# Patient Record
Sex: MALE | HISPANIC OR LATINO | Employment: FULL TIME
[De-identification: names, ages, dates, MRNs, and addresses within clinical notes are randomized per-mention and may not be internally consistent; named-entity substitution may affect disease eponyms.]

---

## 2018-08-07 ENCOUNTER — CHARTING TRANS (OUTPATIENT)
Dept: OTHER | Age: 28
End: 2018-08-07

## 2018-08-07 ENCOUNTER — LAB SERVICES (OUTPATIENT)
Dept: OTHER | Age: 28
End: 2018-08-07

## 2018-08-07 LAB — RAPID STREP GROUP A: NORMAL

## 2018-10-31 VITALS
HEIGHT: 72 IN | RESPIRATION RATE: 20 BRPM | SYSTOLIC BLOOD PRESSURE: 120 MMHG | HEART RATE: 77 BPM | TEMPERATURE: 98.6 F | WEIGHT: 210 LBS | DIASTOLIC BLOOD PRESSURE: 62 MMHG | OXYGEN SATURATION: 98 % | BODY MASS INDEX: 28.44 KG/M2

## 2019-11-08 ENCOUNTER — APPOINTMENT (OUTPATIENT)
Dept: GENERAL RADIOLOGY | Facility: HOSPITAL | Age: 29
End: 2019-11-08
Attending: EMERGENCY MEDICINE
Payer: COMMERCIAL

## 2019-11-08 ENCOUNTER — HOSPITAL ENCOUNTER (EMERGENCY)
Facility: HOSPITAL | Age: 29
Discharge: HOME | End: 2019-11-08
Attending: EMERGENCY MEDICINE
Payer: COMMERCIAL

## 2019-11-08 VITALS
OXYGEN SATURATION: 98 % | WEIGHT: 228 LBS | TEMPERATURE: 97 F | HEART RATE: 73 BPM | SYSTOLIC BLOOD PRESSURE: 134 MMHG | DIASTOLIC BLOOD PRESSURE: 78 MMHG | RESPIRATION RATE: 18 BRPM

## 2019-11-08 DIAGNOSIS — R42 DIZZINESS: Primary | ICD-10-CM

## 2019-11-08 DIAGNOSIS — R06.00 DYSPNEA, UNSPECIFIED TYPE: ICD-10-CM

## 2019-11-08 PROCEDURE — 85379 FIBRIN DEGRADATION QUANT: CPT | Performed by: EMERGENCY MEDICINE

## 2019-11-08 PROCEDURE — 93005 ELECTROCARDIOGRAM TRACING: CPT

## 2019-11-08 PROCEDURE — 84484 ASSAY OF TROPONIN QUANT: CPT | Performed by: EMERGENCY MEDICINE

## 2019-11-08 PROCEDURE — 36415 COLL VENOUS BLD VENIPUNCTURE: CPT

## 2019-11-08 PROCEDURE — 93010 ELECTROCARDIOGRAM REPORT: CPT | Performed by: EMERGENCY MEDICINE

## 2019-11-08 PROCEDURE — 99284 EMERGENCY DEPT VISIT MOD MDM: CPT

## 2019-11-08 PROCEDURE — 71046 X-RAY EXAM CHEST 2 VIEWS: CPT | Performed by: EMERGENCY MEDICINE

## 2019-11-08 PROCEDURE — 87430 STREP A AG IA: CPT

## 2019-11-08 PROCEDURE — 85025 COMPLETE CBC W/AUTO DIFF WBC: CPT | Performed by: EMERGENCY MEDICINE

## 2019-11-08 PROCEDURE — 80048 BASIC METABOLIC PNL TOTAL CA: CPT | Performed by: EMERGENCY MEDICINE

## 2019-11-09 NOTE — ED NOTES
Received pt from triage. Pt states he had and episode of dizziness where he was unable to focus for a brief period of time. Pt arrives to room, ambulatory, alert and orientated X3 with c/o left sided chest pain, cough and a sore throat.  Denies any diff raghavendra

## 2019-11-09 NOTE — ED INITIAL ASSESSMENT (HPI)
Pt c/o CP, RAFAEL, and dizziness x1 day, he states that symptoms worsened while walking out of the store x20 minutes prior to arrival which worried him. RR is easy and nonlabored in triage.

## 2019-11-10 NOTE — ED PROVIDER NOTES
Patient Seen in: [facility] AND [facility] Emergency Department      History   Patient presents with:  Dyspnea RAFAEL SOB (respiratory)  Chest Pain Angina (cardiovascular)    Stated Complaint: short of breath    HPI    The patient is a 17-year-old male who presents diaphoretic. HENT:      Head: Normocephalic and atraumatic. Mouth/Throat:      Mouth: Mucous membranes are moist.   Eyes:      Conjunctiva/sclera: Conjunctivae normal.      Pupils: Pupils are equal, round, and reactive to light.    Neck:      Musculo -----------         ------                     CBC W/ DIFFERENTIAL[[id number]]                              Final result                 Please view results for these tests on the individual orders.    39 Kim Najera

## 2019-11-13 ENCOUNTER — HOSPITAL ENCOUNTER (EMERGENCY)
Facility: HOSPITAL | Age: 29
Discharge: HOME | End: 2019-11-13
Attending: EMERGENCY MEDICINE
Payer: COMMERCIAL

## 2019-11-13 VITALS
HEIGHT: 72 IN | SYSTOLIC BLOOD PRESSURE: 136 MMHG | HEART RATE: 70 BPM | BODY MASS INDEX: 30.88 KG/M2 | OXYGEN SATURATION: 100 % | TEMPERATURE: 98 F | WEIGHT: 228 LBS | DIASTOLIC BLOOD PRESSURE: 83 MMHG | RESPIRATION RATE: 18 BRPM

## 2019-11-13 DIAGNOSIS — J06.9 VIRAL URI: Primary | ICD-10-CM

## 2019-11-13 PROCEDURE — 99282 EMERGENCY DEPT VISIT SF MDM: CPT

## 2019-11-13 RX ORDER — IBUPROFEN 600 MG/1
600 TABLET ORAL EVERY 8 HOURS PRN
Qty: 30 TABLET | Refills: 0 | Status: SHIPPED | OUTPATIENT
Start: 2019-11-13 | End: 2019-11-20

## 2019-11-13 RX ORDER — IBUPROFEN 600 MG/1
600 TABLET ORAL ONCE
Status: COMPLETED | OUTPATIENT
Start: 2019-11-13 | End: 2019-11-13

## 2019-11-13 NOTE — ED PROVIDER NOTES
Patient Seen in: [facility] AND [facility] Emergency Department      History   Patient presents with:  Cough/URI    Stated Complaint: cough, sore throat    HPI    33-year-old male with no significant past medical history presents to the emergency department for with no exudates  Eyes: Conjunctivae and EOM are normal. PERRLA  Neck: Normal range of motion. Neck supple. No tracheal deviation present. CV: s1s2+, RRR, no m/r/g, normal distal pulses  Pulmonary/Chest: CTA b/l with no rales, wheezes.   No chest wall ten

## 2019-11-15 ENCOUNTER — OFFICE VISIT (OUTPATIENT)
Dept: INTERNAL MEDICINE CLINIC | Facility: CLINIC | Age: 29
End: 2019-11-15
Payer: COMMERCIAL

## 2019-11-15 VITALS
RESPIRATION RATE: 18 BRPM | WEIGHT: 236 LBS | DIASTOLIC BLOOD PRESSURE: 79 MMHG | SYSTOLIC BLOOD PRESSURE: 123 MMHG | HEIGHT: 72 IN | BODY MASS INDEX: 31.97 KG/M2 | HEART RATE: 80 BPM | TEMPERATURE: 98 F | OXYGEN SATURATION: 98 %

## 2019-11-15 DIAGNOSIS — R00.2 PALPITATIONS: Primary | ICD-10-CM

## 2019-11-15 DIAGNOSIS — R06.00 EXERTIONAL DYSPNEA: ICD-10-CM

## 2019-11-15 DIAGNOSIS — J01.10 ACUTE FRONTAL SINUSITIS, RECURRENCE NOT SPECIFIED: ICD-10-CM

## 2019-11-15 DIAGNOSIS — R68.89 FLU-LIKE SYMPTOMS: ICD-10-CM

## 2019-11-15 DIAGNOSIS — R51.9 HEADACHE DISORDER: ICD-10-CM

## 2019-11-15 DIAGNOSIS — R55 PRE-SYNCOPE: ICD-10-CM

## 2019-11-15 DIAGNOSIS — R07.89 SENSATION OF CHEST PRESSURE: ICD-10-CM

## 2019-11-15 DIAGNOSIS — R53.83 OTHER FATIGUE: ICD-10-CM

## 2019-11-15 DIAGNOSIS — R42 DIZZY SPELLS: ICD-10-CM

## 2019-11-15 PROCEDURE — 99204 OFFICE O/P NEW MOD 45 MIN: CPT | Performed by: INTERNAL MEDICINE

## 2019-11-15 PROCEDURE — 93000 ELECTROCARDIOGRAM COMPLETE: CPT | Performed by: INTERNAL MEDICINE

## 2019-11-15 RX ORDER — AZITHROMYCIN 250 MG/1
TABLET, FILM COATED ORAL
Qty: 6 TABLET | Refills: 0 | Status: SHIPPED | OUTPATIENT
Start: 2019-11-15 | End: 2019-12-07 | Stop reason: ALTCHOICE

## 2019-11-15 NOTE — PROGRESS NOTES
HPI:    Patient ID: Keith Johnson is a 30year old male.     HPI  Patient comes in today for first time to establish care he was recently seen in ER with symptoms of cold congestion cough fatigue work-up was all negative chest x-ray negative he had EKG t Physical Exam   Constitutional: He is oriented to person, place, and time. He appears well-developed and well-nourished. HENT:   Head: Normocephalic and atraumatic. Eyes: Pupils are equal, round, and reactive to light.  Conjunctivae and EOM are normal [E]      Meds This Visit:  Requested Prescriptions     Signed Prescriptions Disp Refills   • azithromycin (ZITHROMAX Z-BATOOL) 250 MG Oral Tab 6 tablet 0     Sig: Take two tablets by mouth today, then one tablet daily.        Imaging & Referrals:  ELECTROCARDI

## 2019-11-17 NOTE — PROGRESS NOTES
Flu negative continue with current care, if not better to let us know, will call him about the other results

## 2019-11-22 ENCOUNTER — TELEPHONE (OUTPATIENT)
Dept: INTERNAL MEDICINE CLINIC | Facility: CLINIC | Age: 29
End: 2019-11-22

## 2019-11-22 NOTE — TELEPHONE ENCOUNTER
Patient came to the office to drop of short term disability forms he states he would like to get billed.   He started on 11-18-19

## 2019-11-25 ENCOUNTER — TELEPHONE (OUTPATIENT)
Dept: INTERNAL MEDICINE CLINIC | Facility: CLINIC | Age: 29
End: 2019-11-25

## 2019-11-25 ENCOUNTER — OFFICE VISIT (OUTPATIENT)
Dept: INTERNAL MEDICINE CLINIC | Facility: CLINIC | Age: 29
End: 2019-11-25
Payer: COMMERCIAL

## 2019-11-25 VITALS
BODY MASS INDEX: 31.15 KG/M2 | WEIGHT: 230 LBS | SYSTOLIC BLOOD PRESSURE: 132 MMHG | RESPIRATION RATE: 16 BRPM | TEMPERATURE: 97 F | HEART RATE: 63 BPM | DIASTOLIC BLOOD PRESSURE: 82 MMHG | HEIGHT: 72 IN | OXYGEN SATURATION: 99 %

## 2019-11-25 DIAGNOSIS — R76.8 POSITIVE CMV IGG SEROLOGY: ICD-10-CM

## 2019-11-25 DIAGNOSIS — R07.89 SENSATION OF CHEST PRESSURE: ICD-10-CM

## 2019-11-25 DIAGNOSIS — R06.00 EXERTIONAL DYSPNEA: ICD-10-CM

## 2019-11-25 DIAGNOSIS — R00.2 PALPITATIONS: ICD-10-CM

## 2019-11-25 DIAGNOSIS — M79.10 MYALGIA: ICD-10-CM

## 2019-11-25 DIAGNOSIS — R42 DIZZY SPELLS: ICD-10-CM

## 2019-11-25 DIAGNOSIS — R53.83 OTHER FATIGUE: ICD-10-CM

## 2019-11-25 DIAGNOSIS — Z09 FOLLOW UP: Primary | ICD-10-CM

## 2019-11-25 PROCEDURE — 99214 OFFICE O/P EST MOD 30 MIN: CPT | Performed by: INTERNAL MEDICINE

## 2019-11-25 NOTE — TELEPHONE ENCOUNTER
Patient is experiencing headache, dizziness, chest pain, and having a hard time breathing. Transferring to triage.

## 2019-11-25 NOTE — TELEPHONE ENCOUNTER
Pt states he was seen in the office last Monday 11/18/19 for difficulty breathing, headache, and lightheadedness. Pt states he has had no improvement in symptoms. Pt states when he walks for too long he gets short of breath. Pt feels lightheaded when he gets short of breath. Pt also has a constant headache. Pt has completed course of Z chas as prescribed. Pt denies nasal congestion. Pt did not get Holter monitor today, states he had to cancel appt as scheduled today as he overslept. Pt also states he continues to have the chest pain that he has had for about two weeks. Appt scheduled for today, pt disconnected call. Left message to call back to discuss chest pain further.

## 2019-11-25 NOTE — PROGRESS NOTES
HPI:    Patient ID: Celi Spencer is a 30year old male.     HPI  Patient comes in for follow-up he complain continues to feel tired short of breath sometimes chest pain dizzy he went to immediate care also and he had some extensive lab work out on top and no friction rub. No murmur heard. Pulmonary/Chest: Effort normal and breath sounds normal.   Abdominal: Soft. Bowel sounds are normal. Musculoskeletal: Normal range of motion. Neurological: He is alert and oriented to person, place, and time.

## 2019-11-26 ENCOUNTER — TELEPHONE (OUTPATIENT)
Dept: INTERNAL MEDICINE CLINIC | Facility: CLINIC | Age: 29
End: 2019-11-26

## 2019-11-26 NOTE — TELEPHONE ENCOUNTER
Patient stated he dropped the FMLA form to the [place] site and he would like to know the status of the form. Please call him back. Thank you.

## 2019-11-26 NOTE — TELEPHONE ENCOUNTER
Dr. Freddy Watson,    [place] pending. Pt is requesting intermittent leave of 1-3 days per WEEK starting from 11/18/19 due to dizziness, dyspnea for 6 months. Do you approve? Please advise.     Thank you,  [facility]

## 2019-11-26 NOTE — TELEPHONE ENCOUNTER
Wrong type of Forms recvd at Forms Dept. Spoke to pt to have Gerardo fax the correct Intermittent FMLA form.

## 2019-11-27 NOTE — TELEPHONE ENCOUNTER
Relayed 's message to pt and pt stated that he will stop by [facility] office to speak with Dr. Huang Escort waiting on correct FMLA forms.

## 2019-11-27 NOTE — TELEPHONE ENCOUNTER
Patient stopped at the [facility] office, brought in forms.   Dr. Alexis Carcamo completed forms, and was faxed back to [phone]

## 2019-12-02 NOTE — TELEPHONE ENCOUNTER
Patient alejandrina in new [facility] paperwork. Dr Reny Sharma filled out the paperwork and spoke to the patient. Faxed to [phone]. Also a copy of paperwork scanned to Forms Dept and copy sent for scanning.

## 2019-12-06 ENCOUNTER — NURSE TRIAGE (OUTPATIENT)
Dept: INTERNAL MEDICINE CLINIC | Facility: CLINIC | Age: 29
End: 2019-12-06

## 2019-12-06 ENCOUNTER — TELEPHONE (OUTPATIENT)
Dept: INTERNAL MEDICINE CLINIC | Facility: CLINIC | Age: 29
End: 2019-12-06

## 2019-12-06 NOTE — TELEPHONE ENCOUNTER
Patient states he is experiencing chest pain, shortness of breath, and fatigue.     Transferred to Triage

## 2019-12-06 NOTE — TELEPHONE ENCOUNTER
Called patient, no answer. Left VM to call us back to  Let us know what else is needed for this FMLA form. Pt has come into [facility] a couple of times for dr to edit FMLA forms.  Needs to specify what he needs on form if he calls back

## 2019-12-06 NOTE — TELEPHONE ENCOUNTER
Patient is requesting call back from Dr. Yvan Vallejo. Patient states it regarding FMLA forms that he needs completed. Please advise.

## 2019-12-07 ENCOUNTER — OFFICE VISIT (OUTPATIENT)
Dept: INTERNAL MEDICINE CLINIC | Facility: CLINIC | Age: 29
End: 2019-12-07
Payer: COMMERCIAL

## 2019-12-07 ENCOUNTER — HOSPITAL ENCOUNTER (OUTPATIENT)
Dept: GENERAL RADIOLOGY | Facility: HOSPITAL | Age: 29
Discharge: HOME | End: 2019-12-07
Attending: INTERNAL MEDICINE
Payer: COMMERCIAL

## 2019-12-07 VITALS
OXYGEN SATURATION: 95 % | WEIGHT: 224 LBS | BODY MASS INDEX: 30.34 KG/M2 | TEMPERATURE: 98 F | HEART RATE: 70 BPM | SYSTOLIC BLOOD PRESSURE: 115 MMHG | DIASTOLIC BLOOD PRESSURE: 70 MMHG | HEIGHT: 72 IN

## 2019-12-07 DIAGNOSIS — R06.02 SHORTNESS OF BREATH: Primary | ICD-10-CM

## 2019-12-07 DIAGNOSIS — R06.02 SHORTNESS OF BREATH: ICD-10-CM

## 2019-12-07 PROCEDURE — 99214 OFFICE O/P EST MOD 30 MIN: CPT | Performed by: INTERNAL MEDICINE

## 2019-12-07 PROCEDURE — 71046 X-RAY EXAM CHEST 2 VIEWS: CPT | Performed by: INTERNAL MEDICINE

## 2019-12-07 RX ORDER — FLUTICASONE PROPIONATE 50 MCG
SPRAY, SUSPENSION (ML) NASAL
Refills: 0 | COMMUNITY
Start: 2019-12-02

## 2019-12-07 RX ORDER — METHYLPREDNISOLONE 4 MG/1
4 TABLET ORAL
Refills: 0 | COMMUNITY
Start: 2019-12-05 | End: 2019-12-12

## 2019-12-07 NOTE — PROGRESS NOTES
Patient ID: Cat Gilmore is a 30year old male. Patient presents with:  Chest Pain: on and off chest pain, SOB, fatigue for a couple days now. HISTORY OF PRESENT ILLNESS:   HPI  Patient presents for above.   Here with shortness of breath that file      Highest education level: Not on file    Occupational History      Not on file    Social Needs      Financial resource strain: Not on file      Food insecurity:        Worry: Not on file        Inability: Not on file      Transportation needs: submental, no submandibular, no tonsillar, no preauricular, no posterior auricular and no occipital adenopathy present.         Head (left side): No submental, no submandibular, no tonsillar, no preauricular, no posterior auricular and no occipital adenopat

## 2019-12-09 NOTE — TELEPHONE ENCOUNTER
Patient walked into the [facility] office with new [facility] paper work to be completed. Patient states he needs additional week patient is having SOB, dizziness, and headaches.     Patient states he paid $25 fee     Jaja Cruz completed  Patient was last seen November 25, 2019 with Dr Celeste Pleitez

## 2019-12-11 ENCOUNTER — TELEPHONE (OUTPATIENT)
Dept: INTERNAL MEDICINE CLINIC | Facility: CLINIC | Age: 29
End: 2019-12-11

## 2019-12-11 NOTE — TELEPHONE ENCOUNTER
Patient calling with condition update , \" feels better \"  Since LOV  On 12/7/2019  For shortness of breath, chest pain     Wants to see Dr. Devorah Macias only    Scheduled for 12/12/2019 at 3pm

## 2019-12-12 ENCOUNTER — OFFICE VISIT (OUTPATIENT)
Dept: INTERNAL MEDICINE CLINIC | Facility: CLINIC | Age: 29
End: 2019-12-12
Payer: COMMERCIAL

## 2019-12-12 ENCOUNTER — TELEPHONE (OUTPATIENT)
Dept: INTERNAL MEDICINE CLINIC | Facility: CLINIC | Age: 29
End: 2019-12-12

## 2019-12-12 VITALS
DIASTOLIC BLOOD PRESSURE: 85 MMHG | TEMPERATURE: 97 F | HEART RATE: 79 BPM | WEIGHT: 226 LBS | BODY MASS INDEX: 30.61 KG/M2 | HEIGHT: 72 IN | OXYGEN SATURATION: 98 % | SYSTOLIC BLOOD PRESSURE: 133 MMHG | RESPIRATION RATE: 19 BRPM

## 2019-12-12 DIAGNOSIS — L98.9 SKIN LESION: ICD-10-CM

## 2019-12-12 DIAGNOSIS — R53.83 OTHER FATIGUE: ICD-10-CM

## 2019-12-12 DIAGNOSIS — M79.10 MYALGIA: Primary | ICD-10-CM

## 2019-12-12 PROCEDURE — 36415 COLL VENOUS BLD VENIPUNCTURE: CPT | Performed by: INTERNAL MEDICINE

## 2019-12-12 PROCEDURE — 99213 OFFICE O/P EST LOW 20 MIN: CPT | Performed by: INTERNAL MEDICINE

## 2019-12-12 NOTE — TELEPHONE ENCOUNTER
Patient ryscarlet paper work to Dr. Kylah Teixeira in his currently office visit .  He need this done by 12-16-19 he will loose his job

## 2019-12-12 NOTE — PROGRESS NOTES
HPI:    Patient ID: Tashi Chang is a 30year old male.     HPI  Patient comes in for follow-up continues to have some body aches myalgias different parts of the body chest single still some shortness of breath is seen a lung doctor was told could hav Currently      Frequency: Never    Drug use: Never       PHYSICAL EXAM:    Physical Exam   Constitutional: He is oriented to person, place, and time. He appears well-developed and well-nourished. HENT:   Head: Normocephalic and atraumatic.    Eyes: Pupils

## 2019-12-13 NOTE — TELEPHONE ENCOUNTER
Spoke with Jazmine at [facility] office to alert Dr. Richard Nash to confirm days for [facility]. Called patient and explained the doctor only will approve 1-2 days per month - 1 day per episode. Patient understood and was calmer. I revised original FMLA form and faxed to [facility] so it would meet the deadline of 12/16. I also offered to do a new form if [facility] would not accept the revised one. Patient may drop forms to open a continuous FMLA.  SC

## 2019-12-13 NOTE — TELEPHONE ENCOUNTER
Dr. Yvette Brasher,    Patients work is stating the intermittent FMLA you filled out for 1-3 days per month and 40 hrs per episode is too much for a non serious health condition. Patient would like it to say 1-5 days a week and 1 day per episode. I explained that this was excessive but I would ask. He states he cannot afford to take a block of time off to find out what is wrong. Do you support?     Thank Logan Osgood

## 2019-12-13 NOTE — TELEPHONE ENCOUNTER
Informed Bryon Alexander that per Dr Quinn Can - he can only 1-2day, not 1-5days. Called pt to inform him that  can only do 1-2days at most.   Pt seemed ok with it. Bryon Alexander states when she told this to the patient he became upset and has kept calling them for this. But she will go ahead and revise this form and they will call him back to let him know. Per pt, also states he doesn't feel well, having SOB and he used his inhaler w/ no relief and so he drove himself to ER. Just FYI.

## 2019-12-16 ENCOUNTER — TELEPHONE (OUTPATIENT)
Dept: ADMINISTRATIVE | Age: 29
End: 2019-12-16

## 2019-12-16 NOTE — TELEPHONE ENCOUNTER
Dr. Reny Sharma,    Patient states there was a misunderstanding with his HR department and relaying what he needed to them and us. He is asking for both continuous FMLA and intermittent.  The continuous FMLA is to cover the 1st 2 weeks in Dec. he missed work due

## 2019-12-17 NOTE — TELEPHONE ENCOUNTER
Informed patient that Dr. Cortez Alt approved his missed two weeks off work and he will fax in new forms.  SC

## 2019-12-23 NOTE — TELEPHONE ENCOUNTER
Dr. Torres Ip,     I have attached the FMLA to cover the two weeks patient was off that you already approved. Please sign off on form:  -Highlight the patient and hit \"Chart\" button.   -In Chart Review, w/in the Encounter tab - click 1 time on the [facility]

## 2020-01-06 NOTE — TELEPHONE ENCOUNTER
Disab requesting medical records recvd and logged for processing. Form asking for most current notes and they have already been faxed on 12/30/19 by SC.  Archived request.

## 2020-01-09 ENCOUNTER — MED REC SCAN ONLY (OUTPATIENT)
Dept: INTERNAL MEDICINE CLINIC | Facility: CLINIC | Age: 30
End: 2020-01-09

## 2020-03-06 ENCOUNTER — OFFICE VISIT (OUTPATIENT)
Dept: INTERNAL MEDICINE CLINIC | Facility: CLINIC | Age: 30
End: 2020-03-06
Payer: COMMERCIAL

## 2020-03-06 VITALS
OXYGEN SATURATION: 98 % | HEART RATE: 60 BPM | WEIGHT: 215 LBS | DIASTOLIC BLOOD PRESSURE: 70 MMHG | TEMPERATURE: 98 F | SYSTOLIC BLOOD PRESSURE: 117 MMHG | BODY MASS INDEX: 29 KG/M2

## 2020-03-06 DIAGNOSIS — K21.9 GASTROESOPHAGEAL REFLUX DISEASE, ESOPHAGITIS PRESENCE NOT SPECIFIED: ICD-10-CM

## 2020-03-06 DIAGNOSIS — H81.10 BENIGN PAROXYSMAL POSITIONAL VERTIGO, UNSPECIFIED LATERALITY: Primary | ICD-10-CM

## 2020-03-06 DIAGNOSIS — H93.12 TINNITUS OF LEFT EAR: ICD-10-CM

## 2020-03-06 PROCEDURE — 99214 OFFICE O/P EST MOD 30 MIN: CPT | Performed by: INTERNAL MEDICINE

## 2020-03-06 RX ORDER — LANSOPRAZOLE 30 MG/1
30 CAPSULE, DELAYED RELEASE ORAL
COMMUNITY

## 2020-03-06 NOTE — PROGRESS NOTES
HPI:    Patient ID: Jameson Villarreal is a 26year old male. HPI  Patient comes in today with complaint of vertigo usually at night when he closes his eyes he continues to have left ear tinnitus and noise but is better.   No nausea no vomiting no blurry present. Cardiovascular: Normal rate, regular rhythm, normal heart sounds and intact distal pulses. Exam reveals no friction rub. No murmur heard. Pulmonary/Chest: Effort normal and breath sounds normal. No respiratory distress. He has no wheezes.  He

## 2020-11-06 ENCOUNTER — APPOINTMENT (OUTPATIENT)
Dept: SURGERY | Age: 30
End: 2020-11-06
Attending: SURGERY

## 2020-11-09 ENCOUNTER — TELEPHONE (OUTPATIENT)
Dept: SURGERY | Age: 30
End: 2020-11-09

## 2020-11-10 ENCOUNTER — V-VISIT (OUTPATIENT)
Dept: SURGERY | Age: 30
End: 2020-11-10

## 2020-11-10 VITALS — BODY MASS INDEX: 29.8 KG/M2 | HEIGHT: 72 IN | WEIGHT: 220 LBS

## 2020-11-10 DIAGNOSIS — K62.5 RECTAL BLEEDING: Primary | ICD-10-CM

## 2020-11-10 PROCEDURE — 99204 OFFICE O/P NEW MOD 45 MIN: CPT | Performed by: SURGERY

## 2020-11-10 RX ORDER — ESCITALOPRAM OXALATE 10 MG/1
TABLET ORAL
COMMUNITY
Start: 2020-08-27
